# Patient Record
Sex: MALE | Race: OTHER | HISPANIC OR LATINO | URBAN - METROPOLITAN AREA
[De-identification: names, ages, dates, MRNs, and addresses within clinical notes are randomized per-mention and may not be internally consistent; named-entity substitution may affect disease eponyms.]

---

## 2022-12-10 ENCOUNTER — EMERGENCY (EMERGENCY)
Facility: HOSPITAL | Age: 37
LOS: 1 days | Discharge: HOME | End: 2022-12-10
Attending: EMERGENCY MEDICINE | Admitting: EMERGENCY MEDICINE
Payer: SELF-PAY

## 2022-12-10 VITALS
RESPIRATION RATE: 20 BRPM | SYSTOLIC BLOOD PRESSURE: 145 MMHG | OXYGEN SATURATION: 100 % | HEART RATE: 97 BPM | TEMPERATURE: 98 F | DIASTOLIC BLOOD PRESSURE: 85 MMHG

## 2022-12-10 VITALS
HEART RATE: 80 BPM | TEMPERATURE: 98 F | DIASTOLIC BLOOD PRESSURE: 63 MMHG | RESPIRATION RATE: 14 BRPM | OXYGEN SATURATION: 99 % | SYSTOLIC BLOOD PRESSURE: 116 MMHG

## 2022-12-10 PROCEDURE — 99284 EMERGENCY DEPT VISIT MOD MDM: CPT

## 2022-12-10 PROCEDURE — 99053 MED SERV 10PM-8AM 24 HR FAC: CPT

## 2022-12-10 RX ORDER — DIPHENHYDRAMINE HCL 50 MG
50 CAPSULE ORAL ONCE
Refills: 0 | Status: COMPLETED | OUTPATIENT
Start: 2022-12-10 | End: 2022-12-10

## 2022-12-10 RX ORDER — HALOPERIDOL DECANOATE 100 MG/ML
5 INJECTION INTRAMUSCULAR ONCE
Refills: 0 | Status: COMPLETED | OUTPATIENT
Start: 2022-12-10 | End: 2022-12-10

## 2022-12-10 RX ADMIN — Medication 50 MILLIGRAM(S): at 06:07

## 2022-12-10 RX ADMIN — Medication 2 MILLIGRAM(S): at 06:08

## 2022-12-10 RX ADMIN — HALOPERIDOL DECANOATE 5 MILLIGRAM(S): 100 INJECTION INTRAMUSCULAR at 06:07

## 2022-12-10 NOTE — ED PROVIDER NOTE - PROGRESS NOTE DETAILS
Patient is in handcuffs with [employer] at bedside.  Is trying to escape/elope from ED.  Eyes P PERRL, head atraumatic, neck supple, no MSK deformities on exam, heart is regular rate and rhythm, lungs are clear to auscultation bilaterally, abdomen is soft without any rebound or guarding.  Skin without any lesions, lacerations. Patient is now sedated.  Was in physical restraints due to agitation after chemical sedation but now sedated.  Restraints are removed and patient brought this trauma C.  Will monitor for sobriety.

## 2022-12-10 NOTE — ED ADULT TRIAGE NOTE - CHIEF COMPLAINT QUOTE
intox    found stumbling on [address], leaning against car.  pt agitated. odor on breath. abrasion to head.

## 2022-12-10 NOTE — ED PROVIDER NOTE - ATTENDING CONTRIBUTION TO CARE
I, Dr Victor Hugo Meehan wrote the initial note in its entirety and the resident only contributed to the progress note and disposition with which I agree.

## 2022-12-10 NOTE — ED ADULT NURSE REASSESSMENT NOTE - NS ED NURSE REASSESS COMMENT FT1
Medication had positive effect on pt, pt is now calm and sleeping. Pt released from 4 point leather restraints and MD Meehan notified. Pt brought to Tr C, placed on nasal capnography. Report given to CHARLENE Ansari
Pt becoming increasingly agitated in , security called and pt placed in 4 point leather restraints as per MD Meehan order. Pt to assessed
Pt in TR C. Pt received from  post medicating for behavior. Pt out of leather restraints placed on cardiac monitor, vitally stable. NSR on the monitor, 100% room air on end tidal. pedal Pulses 2+ bilaterally, resp even unlabored. Will continue to monitor
Received pt at change of shift, PT is resting in stretcher,  arousable to verbal stimuli, A+Ox4. no apparent distress noted. pt arrives for erratic behavior, as per report from night nurse pt was aggressive with staff and had to be restrained and medicated. Respirations even and unlabored, normal work of breathing, no accessory muscle use, speaking in full clear uninterrupted sentences. ABD is soft, non tender, non distended. Pt denies any chest pain, SOB, headache, dizziness, N/V/D, fever, chills.   will continue to monitor.

## 2022-12-10 NOTE — ED PROVIDER NOTE - PATIENT PORTAL LINK FT
You can access the FollowMyHealth Patient Portal offered by [facility] by registering at the following website: [email or web address]. By joining [email or web address]’s FollowMyHealth portal, you will also be able to view your health information using other applications (apps) compatible with our system.

## 2022-12-10 NOTE — ED PROVIDER NOTE - CLINICAL SUMMARY MEDICAL DECISION MAKING FREE TEXT BOX
37-year-old male with unknown past medical history brought in by [facility] and EMS after being found running down the street and trying to break into a car.  Patient is not forthcoming with his medical history is for ingestions tonight.  Patient is try to elope from the ED and [facility] custody.  Due to danger to self and others will have to chemically sedate patient in .  When sedated will bring to the pharmacy.  Likely will monitor patient until he metabolizes the ingestions.  Does not appear to be in any acute distress and his vital signs are within normal limits at this time.  No signs of trauma on body/exam.

## 2022-12-10 NOTE — ED ADULT NURSE NOTE - CHIEF COMPLAINT QUOTE
intox    found stumbling on [address], leaning against car.  pt agitated. odor on breath. abrasion to head.

## 2022-12-10 NOTE — ED ADULT NURSE NOTE - OBJECTIVE STATEMENT
Pt arrives to ED  [facility] for intoxication. Pt arrives to ED with [employer] in handcuffs for safety, not under arrest. Pt is noted to be agitated in amb bay, being verbally aggressive/threatening, not cooperating with staff, refusing to answer questions and attempting to elope. Pt brought to , medicated as per MD order, and changed. Belongings safely secured in [employer]. Respirations are even and unlabored. Pt to be brought to Tr C.

## 2022-12-10 NOTE — ED PROVIDER NOTE - NSFOLLOWUPINSTRUCTIONS_ED_ALL_ED_FT
You were seen in the ED for INTOXICATION and ALTERED MENTAL STATE    It was determined that you have no life threatening injuries or condition that requires you to be admitted to the hospital or have any additional testing while in the ED.     Attached are your results from todays visit.     Please take these results to follow up with your primary care doctor so that they can determine if you need any additional testing or treatment as an outpatient.     If your symptoms worsen or change, you can return to the ED for further evaluation and care at that time.

## 2022-12-10 NOTE — ED PROVIDER NOTE - OBJECTIVE STATEMENT
37-year-old male with unknown past medical history brought in by [facility] and EMS after being found running down the street appearing intoxicated patient was found after he was trying to break into a car.  Patient is not forthcoming with what happened and is not forthcoming with what ingestions he had tonight prior to being brought to the hospital.  Patient denies any trauma, chest pain, abdominal pain, nausea, vomiting, diarrhea,, drug use, alcohol use, smoking. B positive

## 2024-10-15 NOTE — ED ADULT TRIAGE NOTE - PAIN RATING/NUMBER SCALE (0-10): REST
Have fasting blood tests done on Saturday or any morning that you can get to the lab close to 8am  We will let you know when we have results and if there is anything we need to do based on results  
0